# Patient Record
Sex: MALE | Race: BLACK OR AFRICAN AMERICAN | NOT HISPANIC OR LATINO | URBAN - METROPOLITAN AREA
[De-identification: names, ages, dates, MRNs, and addresses within clinical notes are randomized per-mention and may not be internally consistent; named-entity substitution may affect disease eponyms.]

---

## 2019-07-12 ENCOUNTER — EMERGENCY (EMERGENCY)
Facility: HOSPITAL | Age: 24
LOS: 1 days | Discharge: HOME | End: 2019-07-12
Attending: EMERGENCY MEDICINE | Admitting: EMERGENCY MEDICINE
Payer: MEDICAID

## 2019-07-12 VITALS
TEMPERATURE: 98 F | HEIGHT: 66 IN | HEART RATE: 105 BPM | RESPIRATION RATE: 18 BRPM | DIASTOLIC BLOOD PRESSURE: 65 MMHG | WEIGHT: 147.93 LBS | OXYGEN SATURATION: 100 % | SYSTOLIC BLOOD PRESSURE: 100 MMHG

## 2019-07-12 LAB
ALBUMIN SERPL ELPH-MCNC: 3.5 G/DL — SIGNIFICANT CHANGE UP (ref 3.4–5)
ALP SERPL-CCNC: 54 U/L — SIGNIFICANT CHANGE UP (ref 40–120)
ALT FLD-CCNC: 20 U/L — SIGNIFICANT CHANGE UP (ref 12–42)
ANION GAP SERPL CALC-SCNC: 8 MMOL/L — LOW (ref 9–16)
AST SERPL-CCNC: 18 U/L — SIGNIFICANT CHANGE UP (ref 15–37)
BASOPHILS NFR BLD AUTO: 0.4 % — SIGNIFICANT CHANGE UP (ref 0–2)
BILIRUB SERPL-MCNC: 0.3 MG/DL — SIGNIFICANT CHANGE UP (ref 0.2–1.2)
BUN SERPL-MCNC: 16 MG/DL — SIGNIFICANT CHANGE UP (ref 7–23)
CALCIUM SERPL-MCNC: 8.7 MG/DL — SIGNIFICANT CHANGE UP (ref 8.5–10.5)
CHLORIDE SERPL-SCNC: 107 MMOL/L — SIGNIFICANT CHANGE UP (ref 96–108)
CO2 SERPL-SCNC: 30 MMOL/L — SIGNIFICANT CHANGE UP (ref 22–31)
CREAT SERPL-MCNC: 0.95 MG/DL — SIGNIFICANT CHANGE UP (ref 0.5–1.3)
EOSINOPHIL NFR BLD AUTO: 1.4 % — SIGNIFICANT CHANGE UP (ref 0–6)
GLUCOSE SERPL-MCNC: 85 MG/DL — SIGNIFICANT CHANGE UP (ref 70–99)
HCT VFR BLD CALC: 40.3 % — SIGNIFICANT CHANGE UP (ref 39–50)
HGB BLD-MCNC: 13.8 G/DL — SIGNIFICANT CHANGE UP (ref 13–17)
IMM GRANULOCYTES NFR BLD AUTO: 0.7 % — SIGNIFICANT CHANGE UP (ref 0–1.5)
LYMPHOCYTES # BLD AUTO: 20.3 % — SIGNIFICANT CHANGE UP (ref 13–44)
MCHC RBC-ENTMCNC: 31.2 PG — SIGNIFICANT CHANGE UP (ref 27–34)
MCHC RBC-ENTMCNC: 34.2 G/DL — SIGNIFICANT CHANGE UP (ref 32–36)
MCV RBC AUTO: 91.2 FL — SIGNIFICANT CHANGE UP (ref 80–100)
MONOCYTES NFR BLD AUTO: 9.8 % — SIGNIFICANT CHANGE UP (ref 2–14)
NEUTROPHILS NFR BLD AUTO: 67.4 % — SIGNIFICANT CHANGE UP (ref 43–77)
PLATELET # BLD AUTO: 215 K/UL — SIGNIFICANT CHANGE UP (ref 150–400)
POTASSIUM SERPL-MCNC: 4.1 MMOL/L — SIGNIFICANT CHANGE UP (ref 3.5–5.3)
POTASSIUM SERPL-SCNC: 4.1 MMOL/L — SIGNIFICANT CHANGE UP (ref 3.5–5.3)
PROT SERPL-MCNC: 6.2 G/DL — LOW (ref 6.4–8.2)
RBC # BLD: 4.42 M/UL — SIGNIFICANT CHANGE UP (ref 4.2–5.8)
RBC # FLD: 11.7 % — SIGNIFICANT CHANGE UP (ref 10.3–14.5)
SODIUM SERPL-SCNC: 145 MMOL/L — SIGNIFICANT CHANGE UP (ref 132–145)
WBC # BLD: 9.8 K/UL — SIGNIFICANT CHANGE UP (ref 3.8–10.5)
WBC # FLD AUTO: 9.8 K/UL — SIGNIFICANT CHANGE UP (ref 3.8–10.5)

## 2019-07-12 PROCEDURE — 93010 ELECTROCARDIOGRAM REPORT: CPT

## 2019-07-12 PROCEDURE — 99284 EMERGENCY DEPT VISIT MOD MDM: CPT

## 2019-07-12 RX ORDER — IBUPROFEN 200 MG
600 TABLET ORAL ONCE
Refills: 0 | Status: COMPLETED | OUTPATIENT
Start: 2019-07-12 | End: 2019-07-12

## 2019-07-12 RX ADMIN — Medication 600 MILLIGRAM(S): at 21:46

## 2019-07-12 NOTE — ED PROVIDER NOTE - NSFOLLOWUPINSTRUCTIONS_ED_ALL_ED_FT
Please follow up with your primary physician in 1-2 days for re evaluation.  Please return to ER immediately should your symptoms worsen or if you have any concern prior to this recommended follow up.    [place] [facility] [place]    Musculoskeletal Pain  Musculoskeletal pain refers to aches and pains in your bones, joints, muscles, and the tissues that surround them. This pain can occur in any part of the body. It can last for a short time (acute) or a long time (chronic).    A physical exam, lab tests, and imaging studies may be done to find the cause of your musculoskeletal pain.    Follow these instructions at home:  Image   Lifestyle     Try to control or lower your stress levels. Stress increases muscle tension and can worsen musculoskeletal pain. It is important to recognize when you are anxious or stressed and learn ways to manage it. This may include:  Meditation or yoga.  Cognitive or behavioral therapy.  Acupuncture or massage therapy.  You may continue all activities unless the activities cause more pain. When the pain gets better, slowly resume your normal activities. Gradually increase the intensity and duration of your activities or exercise.  Managing pain, stiffness, and swelling     Take over-the-counter and prescription medicines only as told by your health care provider.  When your pain is severe, bed rest may be helpful. Lie or sit in any position that is comfortable, but get out of bed and walk around at least every couple of hours.  If directed, apply heat to the affected area as often as told by your health care provider. Use the heat source that your health care provider recommends, such as a moist heat pack or a heating pad.  Place a towel between your skin and the heat source.  Leave the heat on for 20–30 minutes.  Remove the heat if your skin turns bright red. This is especially important if you are unable to feel pain, heat, or cold. You may have a greater risk of getting burned.  If directed, put ice on the painful area.  Put ice in a plastic bag.  Place a towel between your skin and the bag.  Leave the ice on for 20 minutes, 2–3 times a day.  General instructions     Your health care provider may recommend that you see a physical therapist. This person can help you come up with a safe exercise program. Do any exercises as told by your physical therapist.  Keep all follow-up visits, including any physical therapy visits, as told by your health care providers. This is important.  Contact a health care provider if:  Your pain gets worse.  Medicines do not help ease your pain.  You cannot use the part of your body that hurts, such as your arm, leg, or neck.  You have trouble sleeping.  You have trouble doing your normal activities.  Get help right away if:  You have a new injury and your pain is worse or different.  You feel numb or you have tingling in the painful area.  Summary  Musculoskeletal pain refers to aches and pains in your bones, joints, muscles, and the tissues that surround them.  This pain can occur in any part of the body.  Your health care provider may recommend that you see a physical therapist. This person can help you come up with a safe exercise program. Do any exercises as told by your physical therapist.  Lower your stress level. Stress can worsen musculoskeletal pain. Ways to lower stress may include meditation, yoga, cognitive or behavioral therapy, acupuncture, and massage therapy.  This information is not intended to replace advice given to you by your health care provider. Make sure you discuss any questions you have with your health care provider.    Document Released: 12/18/2006 Document Revised: 01/17/2018 Document Reviewed: 01/17/2018  [employer] Interactive Patient Education © 2019 [employer]

## 2019-07-12 NOTE — ED ADULT TRIAGE NOTE - CHIEF COMPLAINT QUOTE
patient received haldol from [facility]. c/o shoulder pain, headache, leg pain. ambulates with steady gait.

## 2019-07-12 NOTE — ED ADULT NURSE NOTE - CHIEF COMPLAINT QUOTE
patient received haldol from [facility]. c/o shoulder pain, headache, leg pain. ambulates with steady gait.

## 2019-07-12 NOTE — ED ADULT NURSE NOTE - NSIMPLEMENTINTERV_GEN_ALL_ED
Implemented All Universal Safety Interventions:  [place] to call system. Call bell, personal items and telephone within reach. Instruct patient to call for assistance. Room bathroom lighting operational. Non-slip footwear when patient is off stretcher. Physically safe environment: no spills, clutter or unnecessary equipment. Stretcher in lowest position, wheels locked, appropriate side rails in place.

## 2019-07-12 NOTE — ED PROVIDER NOTE - OBJECTIVE STATEMENT
25 y/o male, BIB EMS, presents to the ED with complaints of vague symptoms after being given Haldol in the buttock from [facility] following psychiatric hospitalization x several days. He is c/o shoulder pain, headache, and bilateral leg pain. States he feels his "shoulder and neck will fall off his body." Able to ambulate without difficulty. Denies fever, chills, chest pain, SOB, N/V, SI/HI.

## 2019-07-15 DIAGNOSIS — M25.519 PAIN IN UNSPECIFIED SHOULDER: ICD-10-CM

## 2019-07-15 DIAGNOSIS — R51 HEADACHE: ICD-10-CM

## 2019-07-15 DIAGNOSIS — M79.604 PAIN IN RIGHT LEG: ICD-10-CM

## 2019-07-15 DIAGNOSIS — M79.605 PAIN IN LEFT LEG: ICD-10-CM

## 2019-09-12 ENCOUNTER — EMERGENCY (EMERGENCY)
Facility: HOSPITAL | Age: 24
LOS: 1 days | Discharge: HOME | End: 2019-09-12
Admitting: EMERGENCY MEDICINE
Payer: MEDICAID

## 2019-09-12 ENCOUNTER — EMERGENCY (EMERGENCY)
Facility: HOSPITAL | Age: 24
LOS: 1 days | Discharge: HOME | End: 2019-09-12
Attending: EMERGENCY MEDICINE | Admitting: EMERGENCY MEDICINE
Payer: MEDICAID

## 2019-09-12 VITALS
RESPIRATION RATE: 18 BRPM | OXYGEN SATURATION: 97 % | DIASTOLIC BLOOD PRESSURE: 78 MMHG | SYSTOLIC BLOOD PRESSURE: 117 MMHG | HEART RATE: 88 BPM | TEMPERATURE: 98 F

## 2019-09-12 VITALS — OXYGEN SATURATION: 97 %

## 2019-09-12 VITALS
OXYGEN SATURATION: 95 % | RESPIRATION RATE: 18 BRPM | TEMPERATURE: 98 F | SYSTOLIC BLOOD PRESSURE: 110 MMHG | DIASTOLIC BLOOD PRESSURE: 72 MMHG | HEART RATE: 95 BPM

## 2019-09-12 VITALS
OXYGEN SATURATION: 98 % | SYSTOLIC BLOOD PRESSURE: 130 MMHG | RESPIRATION RATE: 16 BRPM | DIASTOLIC BLOOD PRESSURE: 80 MMHG | HEART RATE: 98 BPM

## 2019-09-12 DIAGNOSIS — R41.82 ALTERED MENTAL STATUS, UNSPECIFIED: ICD-10-CM

## 2019-09-12 DIAGNOSIS — F10.129 ALCOHOL ABUSE WITH INTOXICATION, UNSPECIFIED: ICD-10-CM
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
## 2019-09-12 DIAGNOSIS — S01.111A LACERATION WITHOUT FOREIGN BODY OF RIGHT EYELID AND PERIOCULAR AREA, INITIAL ENCOUNTER: ICD-10-CM
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
## 2019-09-12 LAB — ETHANOL SERPL-MCNC: <3 MG/DL — SIGNIFICANT CHANGE UP

## 2019-09-12 PROCEDURE — 99284 EMERGENCY DEPT VISIT MOD MDM: CPT

## 2019-09-12 PROCEDURE — 70450 CT HEAD/BRAIN W/O DYE: CPT | Mod: 26

## 2019-09-12 PROCEDURE — 12011 RPR F/E/E/N/L/M 2.5 CM/<: CPT

## 2019-09-12 PROCEDURE — 99220: CPT | Mod: 25

## 2019-09-12 RX ORDER — MIDAZOLAM HYDROCHLORIDE 1 MG/ML
4 INJECTION, SOLUTION INTRAMUSCULAR; INTRAVENOUS ONCE
Refills: 0 | Status: DISCONTINUED | OUTPATIENT
Start: 2019-09-12 | End: 2019-09-12

## 2019-09-12 RX ORDER — DIPHENHYDRAMINE HCL 50 MG
50 CAPSULE ORAL EVERY 4 HOURS
Refills: 0 | Status: DISCONTINUED | OUTPATIENT
Start: 2019-09-12 | End: 2019-09-16

## 2019-09-12 RX ORDER — HALOPERIDOL DECANOATE 100 MG/ML
5 INJECTION INTRAMUSCULAR ONCE
Refills: 0 | Status: COMPLETED | OUTPATIENT
Start: 2019-09-12 | End: 2019-09-12

## 2019-09-12 RX ADMIN — Medication 50 MILLIGRAM(S): at 06:40

## 2019-09-12 RX ADMIN — Medication 2 MILLIGRAM(S): at 06:40

## 2019-09-12 RX ADMIN — MIDAZOLAM HYDROCHLORIDE 4 MILLIGRAM(S): 1 INJECTION, SOLUTION INTRAMUSCULAR; INTRAVENOUS at 07:25

## 2019-09-12 RX ADMIN — MIDAZOLAM HYDROCHLORIDE 4 MILLIGRAM(S): 1 INJECTION, SOLUTION INTRAMUSCULAR; INTRAVENOUS at 07:00

## 2019-09-12 RX ADMIN — HALOPERIDOL DECANOATE 5 MILLIGRAM(S): 100 INJECTION INTRAMUSCULAR at 06:40

## 2019-09-12 NOTE — ED ADULT NURSE NOTE - DISCHARGE DATE/TIME
Received labs from [facility] and scanned under media tab. Hgb increased from 10 to 11, ferritin 24, iron 43. CMP WNL except ALP increased from 109 to 125 when compared with labs from 1/10/2018. Will continue to monitor. 12-Sep-2019 15:52

## 2019-09-12 NOTE — ED ADULT TRIAGE NOTE - CHIEF COMPLAINT QUOTE
Pt. brought in by EMS for alter mental status, pt. admits to drinking alcohol, and smoking marijuana. Pt. was aggressive with staff earlier today and needed to be escorted out by security.

## 2019-09-12 NOTE — ED PROVIDER NOTE - PATIENT PORTAL LINK FT
You can access the FollowMyHealth Patient Portal offered by [facility] by registering at the following website: [email or web address]. By joining [employer]’s FollowMyHealth portal, you will also be able to view your health information using other applications (apps) compatible with our system.

## 2019-09-12 NOTE — ED CDU PROVIDER DISPOSITION NOTE - NSFOLLOWUPINSTRUCTIONS_ED_ALL_ED_FT
-PLEASE FOLLOW-UP WITH YOUR PRIMARY CARE DOCTOR IN 1-2 DAYS.  BRING ALL PAPERWORK FROM TODAY'S VISIT TO YOUR FOLLOW-UP VISIT.  IF YOU DO NOT HAVE A PRIMARY CARE DOCTOR PLEASE CALL [phone] AND ASK FOR MS. MARTÍN LOGAN.  SHE CAN HELP YOU MAKE A FOLLOW-UP APPOINTMENT.  HER HOURS ARE 11AM-7PM MONDAY - FRIDAY.    -PLEASE RETURN TO THE ER IMMEDIATELY OR CALL 911 FOR ANY HIGH FEVER, TROUBLE BREATHING, VOMITING, SEVERE PAIN, OR ANY OTHER CONCERNS.
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
## 2019-09-12 NOTE — ED ADULT NURSE NOTE - CHPI ED NUR SYMPTOMS NEG
no dizziness/no fever/no decreased eating/drinking/no nausea/no vomiting/no weakness/no pain/no chills/no tingling

## 2019-09-12 NOTE — ED PROVIDER NOTE - OBJECTIVE STATEMENT
Pt BIBA for public EtOH intox. Admits to having heavy marijuana and EtOH intake today. No acute medical complaints or apparent trauma noted.  Minimally cooperative with hx and ROS due to heavy intox. No respiratory distress, pain, vomiting, or urinary/bowel incontinence noted. + steri-strips to right side of face.
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
## 2019-09-12 NOTE — ED PROVIDER NOTE - UNABLE TO OBTAIN
Urgent need for Intervention patient arrives with ams, unable to provide a history or cooperate with physical exam

## 2019-09-12 NOTE — ED PROVIDER NOTE - CRANIAL NERVE AND PUPILLARY EXAM
cranial nerves 2-12 intact Bilobed Flap Text: The defect edges were debeveled with a #15 scalpel blade.  Given the location of the defect and the proximity to free margins a bilobe flap was deemed most appropriate.  Using a sterile surgical marker, an appropriate bilobe flap drawn around the defect.    The area thus outlined was incised deep to adipose tissue with a #15 scalpel blade.  The skin margins were undermined to an appropriate distance in all directions utilizing iris scissors.

## 2019-09-12 NOTE — ED PROVIDER NOTE - PROGRESS NOTE DETAILS
Accept sign out from Dr. lei.  Pt came in with AMS - drug intoxication vs psych d/o.  Required chemical sedation.  Will f/u HCT.  Will re-evaluate once metabolized and will consult psych as needed.

## 2019-09-12 NOTE — ED CDU PROVIDER DISPOSITION NOTE - PATIENT PORTAL LINK FT
You can access the FollowMyHealth Patient Portal offered by Health system by registering at the following website: [email or web address]. By joining [employer]’s FollowMyHealth portal, you will also be able to view your health information using other applications (apps) compatible with our system.

## 2019-09-12 NOTE — ED CDU PROVIDER INITIAL DAY NOTE - PROGRESS NOTE DETAILS
Pt arouses to tactile stimuli but unable to give Hx, too drowsy. Will continue to monitor. The patient is now awake and alert, clinically sober.  Able to walk a straight line.  Repeat exam and neuro/cranial nerve exams normal.  No evidence of head/neck trauma.  Patient denies any pain or other complaints.  Denies cp/sob/ha/abd pain.  Abd soft, lungs clear, heart exam normal.  Margy po challenge.  Patient says only used alcohol no other substances.  Denies any assault.  Pt is cooperative but very rude with staff and not giving much information.  Will d/c and recommend follow-up with PCP.

## 2022-03-12 NOTE — ED PROVIDER NOTE - CLINICAL SUMMARY MEDICAL DECISION MAKING FREE TEXT BOX
Pt in ED following psychiatric hospitalization for several days and given Haldol. Pt is c/o body aches. Benign physical exam without focal deficit. Well appearing. Denies suicidal or homicidal ideation. Will check basic labs, EKG, and give Motrin per Pt's request. Anticipate discharge and promoted psychiatry and PMD f/u. Pt is agreeable to plan. Patient c/o lower back pain and requesting a new cane Pt in ED following psychiatric hospitalization for several days and given Haldol. Pt is c/o body aches. Benign physical exam without focal deficit. Well appearing. Denies suicidal or homicidal ideation. Will check basic labs, EKG, and give Motrin per Pt's request. Anticipate discharge and promoted psychiatry and PMD f/u. Pt is agreeable to plan.    Update @ 2803 - Labs and EKG reviewed.  EKG non ischemic with sinus arrhythmia.  Labs unremarkable.  Patient feeling improved w motrin in ED.  Will discharge home with instruction for prompt PCP and psychiatry follow up in 1-2 days for re eval.  Patient is instructed to return to ED immediately should symptoms worsen or if he has any concern prior to this recommended follow up.  Patient is aware of plan and verbalizes his understanding.  Will discharge home at this time.

## 2023-06-28 NOTE — ED PROVIDER NOTE - SCRIBE NAME
Jolanta Little Island Pedicle Flap Text: The defect edges were debeveled with a #15 scalpel blade.  Given the location of the defect, shape of the defect and the proximity to free margins an island pedicle advancement flap was deemed most appropriate.  Using a sterile surgical marker, an appropriate advancement flap was drawn incorporating the defect, outlining the appropriate donor tissue and placing the expected incisions within the relaxed skin tension lines where possible.    The area thus outlined was incised deep to adipose tissue with a #15 scalpel blade.  The skin margins were undermined to an appropriate distance in all directions around the primary defect and laterally outward around the island pedicle utilizing iris scissors.  There was minimal undermining beneath the pedicle flap.

## 2023-07-24 NOTE — ED ADULT TRIAGE NOTE - DIRECT TO ROOM CARE INITIATED:
Patient aware of instructions regarding resuming ASA as documented in this communication thread. Last visit 1/27 Soo Consuelo  Please sign script to refill atorvastatin if in agreement, thank you. 12-Jul-2019 20:25

## 2023-11-15 NOTE — ED PROVIDER NOTE - DISCHARGE REVIEW MATERIAL PRESENTED
Patient requesting  RESEND Rx Discontinued by team member on 10/31    Last filled:    HYDROcodone-acetaminophen (NORCO) 5-325 MG per tablet (Discontinued) 60 tablet 0 2023 10/31/2023    Sig - Route: Take 1 tablet by mouth 2 times daily as needed for Pain. Do not start before 2023. - Oral    Sent to pharmacy as: HYDROcodone-Acetaminophen 5-325 MG Oral Tablet (NORCO)    Class: Eprescribe        Discontinued by: Melissa Najera PA-C 10/31/23 1542 [Stop Taking at Discharge]    LOV: 10/20/23     Upcomin23    Called patient, left msg baclofen sent  with refill; prescription should still be on file at pharmacy.   .